# Patient Record
Sex: FEMALE | Race: WHITE | NOT HISPANIC OR LATINO | Employment: FULL TIME | URBAN - NONMETROPOLITAN AREA
[De-identification: names, ages, dates, MRNs, and addresses within clinical notes are randomized per-mention and may not be internally consistent; named-entity substitution may affect disease eponyms.]

---

## 2017-12-28 ENCOUNTER — PROCEDURE VISIT (OUTPATIENT)
Dept: OBSTETRICS AND GYNECOLOGY | Facility: CLINIC | Age: 32
End: 2017-12-28

## 2017-12-28 VITALS
DIASTOLIC BLOOD PRESSURE: 76 MMHG | SYSTOLIC BLOOD PRESSURE: 126 MMHG | BODY MASS INDEX: 41.44 KG/M2 | WEIGHT: 264 LBS | HEIGHT: 67 IN

## 2017-12-28 DIAGNOSIS — Z01.419 ENCOUNTER FOR GYNECOLOGICAL EXAMINATION WITHOUT ABNORMAL FINDING: Primary | ICD-10-CM

## 2017-12-28 PROCEDURE — 99385 PREV VISIT NEW AGE 18-39: CPT | Performed by: OBSTETRICS & GYNECOLOGY

## 2018-01-08 DIAGNOSIS — Z01.419 ENCOUNTER FOR GYNECOLOGICAL EXAMINATION WITHOUT ABNORMAL FINDING: ICD-10-CM

## 2018-01-24 ENCOUNTER — OFFICE VISIT (OUTPATIENT)
Dept: OBSTETRICS AND GYNECOLOGY | Facility: CLINIC | Age: 33
End: 2018-01-24

## 2018-01-24 VITALS
DIASTOLIC BLOOD PRESSURE: 88 MMHG | WEIGHT: 265 LBS | BODY MASS INDEX: 41.59 KG/M2 | SYSTOLIC BLOOD PRESSURE: 146 MMHG | HEIGHT: 67 IN

## 2018-01-24 DIAGNOSIS — Z32.02 PREGNANCY TEST NEGATIVE: ICD-10-CM

## 2018-01-24 DIAGNOSIS — Z30.017 ENCOUNTER FOR INSERTION SUBDERMAL CONTRACEPTIVE: Primary | ICD-10-CM

## 2018-01-24 LAB
B-HCG UR QL: NEGATIVE
INTERNAL NEGATIVE CONTROL: NEGATIVE
INTERNAL POSITIVE CONTROL: POSITIVE
Lab: NORMAL

## 2018-01-24 PROCEDURE — 11981 INSERTION DRUG DLVR IMPLANT: CPT | Performed by: OBSTETRICS & GYNECOLOGY

## 2018-01-24 PROCEDURE — 81025 URINE PREGNANCY TEST: CPT | Performed by: OBSTETRICS & GYNECOLOGY

## 2018-01-24 RX ORDER — ACETAMINOPHEN, ASPIRIN AND CAFFEINE 250; 250; 65 MG/1; MG/1; MG/1
1 TABLET, FILM COATED ORAL EVERY 6 HOURS PRN
COMMUNITY

## 2018-01-24 RX ORDER — ETONOGESTREL 68 MG/1
IMPLANT SUBCUTANEOUS
COMMUNITY
Start: 2018-01-23

## 2018-01-24 NOTE — PROGRESS NOTES
Nexplanon Insertion    Patient's last menstrual period was 01/19/2018.    Date of procedure:  1/24/2018    Risks and benefits discussed? yes  All questions answered? yes  Consents given by the patient  Written consent obtained? yes    Local anesthesia used:  yes - 5 cc's of  Meds; anesthesia local: 1% lidocaine    Procedure documentation:    The upper left arm (non-dominant) was marked at the intended site of insertion.  Betadine was used to cleanse the skin.  Local anesthesia was injected.  The Nexplanon was placed subdermally without difficulty.  The devise was able to be palpated in the arm by both myself and Janis.  Steri-strips were then placed across the site of insertion and the arm was wrapped.    She tolerated the procedure well.  There were no complications.  EBL was minimal.    Post procedure instructions: Remove the wrapping in 24 hours and the steri-strips in 5 days.  The patient has been advised to contact the office if she develops fever, redness, swelling, pain, or discharge occurs at the procedure site.  She has been counseled on the effectiveness of her contraception as well.    Follow up needed: PRN    This note was electronically signed.  Lilo Montelongo M.D.

## 2021-06-29 ENCOUNTER — OFFICE VISIT (OUTPATIENT)
Dept: OBSTETRICS AND GYNECOLOGY | Facility: CLINIC | Age: 36
End: 2021-06-29

## 2021-06-29 VITALS
SYSTOLIC BLOOD PRESSURE: 158 MMHG | DIASTOLIC BLOOD PRESSURE: 96 MMHG | HEIGHT: 67 IN | WEIGHT: 261.2 LBS | BODY MASS INDEX: 41 KG/M2

## 2021-06-29 DIAGNOSIS — Z30.46 ENCOUNTER FOR SURVEILLANCE OF IMPLANTABLE SUBDERMAL CONTRACEPTIVE: ICD-10-CM

## 2021-06-29 DIAGNOSIS — R03.0 ELEVATED BLOOD PRESSURE READING IN OFFICE WITHOUT DIAGNOSIS OF HYPERTENSION: ICD-10-CM

## 2021-06-29 DIAGNOSIS — Z01.419 ENCOUNTER FOR GYNECOLOGICAL EXAMINATION WITHOUT ABNORMAL FINDING: Primary | ICD-10-CM

## 2021-06-29 DIAGNOSIS — Z12.4 SCREENING FOR CERVICAL CANCER: ICD-10-CM

## 2021-06-29 DIAGNOSIS — Z12.31 ENCOUNTER FOR SCREENING MAMMOGRAM FOR MALIGNANT NEOPLASM OF BREAST: ICD-10-CM

## 2021-06-29 PROCEDURE — 99385 PREV VISIT NEW AGE 18-39: CPT | Performed by: PHYSICIAN ASSISTANT

## 2021-06-29 NOTE — PATIENT INSTRUCTIONS
Self breast exam monthly  Screening mammogram now  Regular exercise   Recommend establish care with PCP. Patient requests a provider in [place].   RTO 3 weeks for Nexplanon removal and reinsertion

## 2021-06-29 NOTE — PROGRESS NOTES
Subjective   Chief Complaint   Patient presents with   • Gynecologic Exam     Last pap done 17-WNL, Patient has a Nexplanon that is  and would  like to get a new one, No complaints       Janis Oshea is a 36 y.o. year old  presenting to be seen for her annual gynecological exam.   Patient has a Nexplanon which  in January of this year.  She has not been sexually active for the last 2 years.  She would like removal and reinsertion with a new Nexplanon.  She reports she has had some random light bleeds the last 3 to 4 months after Nexplanon .  Patient would like to get a screening mammogram.  She has history of breast cancer with a maternal grandmother and 2 maternal great aunts.  Patient's blood pressure is noted.  She does not have a PCP and would like referral to someone in [place].  She has had elevated blood pressure readings in the past but has not been placed on medication.    Past Medical History:   Diagnosis Date   • Depression    • History of Papanicolaou smear of cervix 2016    NORMAL    • Nexplanon insertion 2018   • Ovarian cyst    • PCOS (polycystic ovarian syndrome)    • Pre-menstrual mood disorder         Current Outpatient Medications:   •  aspirin-acetaminophen-caffeine (EXCEDRIN MIGRAINE) 250-250-65 MG per tablet, Take 1 tablet by mouth Every 6 (Six) Hours As Needed for Headache., Disp: , Rfl:   •  NEXPLANON 68 MG implant subdermal implant, , Disp: , Rfl:     Current Facility-Administered Medications:   •  [START ON 2021] Etonogestrel (NEXPLANON) 68 MG subdermal implant 1 each, 1 each, Intradermal, Once, Kim Lopez PA-C   No Known Allergies   Past Surgical History:   Procedure Laterality Date   • EAR TUBES Bilateral    • WISDOM TOOTH EXTRACTION        Social History     Socioeconomic History   • Marital status: Single     Spouse name: Not on file   • Number of children: Not on file   • Years of education: Not on file   • Highest  "education level: Not on file   Tobacco Use   • Smoking status: Current Every Day Smoker     Packs/day: 0.50     Types: Cigarettes   • Smokeless tobacco: Never Used   Vaping Use   • Vaping Use: Never used   Substance and Sexual Activity   • Alcohol use: No   • Drug use: No   • Sexual activity: Yes     Partners: Male     Birth control/protection: Condom, Implant      Family History   Problem Relation Age of Onset   • No Known Problems Father    • Cervical cancer Mother    • Hypertension Mother    • Hyperlipidemia Mother    • Cancer Paternal Grandmother    • Breast cancer Maternal Grandmother    • Cervical cancer Maternal Grandmother    • Hypertension Maternal Grandmother    • Hyperlipidemia Maternal Grandmother    • Breast cancer Maternal Aunt    • Cervical cancer Maternal Aunt    • Breast cancer Maternal Aunt        Review of Systems   Constitutional: Negative for chills, diaphoresis and fever.   Gastrointestinal: Negative for abdominal pain, constipation, diarrhea, nausea and vomiting.   Genitourinary: Negative for difficulty urinating, dysuria, menstrual problem, pelvic pain and vaginal discharge.   All other systems reviewed and are negative.          Objective   /96   Ht 170.2 cm (67\")   Wt 118 kg (261 lb 3.2 oz)   Breastfeeding No   BMI 40.91 kg/m²     Physical Exam  Constitutional:       Appearance: Normal appearance. She is well-developed. She is morbidly obese.   Eyes:      General: Lids are normal.      Extraocular Movements: Extraocular movements intact.      Conjunctiva/sclera: Conjunctivae normal.   Chest:      Breasts: Breasts are symmetrical.         Right: No inverted nipple, mass, nipple discharge, skin change or tenderness.         Left: No inverted nipple, mass, nipple discharge, skin change or tenderness.   Abdominal:      Palpations: Abdomen is soft.      Tenderness: There is no abdominal tenderness.   Genitourinary:     Labia:         Right: No rash, tenderness or lesion.         Left: " No rash, tenderness or lesion.       Urethra: No prolapse, urethral pain, urethral swelling or urethral lesion.      Vagina: No vaginal discharge, tenderness or lesions.      Cervix: No discharge or lesion.      Uterus: Not enlarged and not tender.       Adnexa:         Right: No mass or tenderness.          Left: No mass or tenderness.     Skin:     General: Skin is warm and dry.      Findings: No bruising or lesion.   Neurological:      Mental Status: She is alert.   Psychiatric:         Attention and Perception: Attention normal.         Mood and Affect: Mood normal.         Speech: Speech normal.         Behavior: Behavior is cooperative.            Result Review :                   Assessment and Plan  Diagnoses and all orders for this visit:    1. Encounter for gynecological examination without abnormal finding (Primary)    2. Screening for cervical cancer  -     Pap IG, Rfx HPV ASCU; Future    3. Encounter for screening mammogram for malignant neoplasm of breast  -     Mammo Screening Digital Tomosynthesis Bilateral With CAD; Future    4. Encounter for surveillance of implantable subdermal contraceptive  -     Etonogestrel (NEXPLANON) 68 MG subdermal implant 1 each    5. Elevated blood pressure reading in office without diagnosis of hypertension  -     Ambulatory Referral to Family Practice      Patient Instructions   Self breast exam monthly  Screening mammogram now  Regular exercise   Recommend establish care with PCP. Patient requests a provider in [place].   RTO 3 weeks for Nexplanon removal and reinsertion              This note was electronically signed.    Kim Lopez PA-C   June 29, 2021

## 2021-07-15 DIAGNOSIS — Z12.4 SCREENING FOR CERVICAL CANCER: ICD-10-CM

## 2021-07-16 ENCOUNTER — APPOINTMENT (OUTPATIENT)
Dept: MAMMOGRAPHY | Facility: HOSPITAL | Age: 36
End: 2021-07-16

## 2021-07-16 DIAGNOSIS — Z12.4 SCREENING FOR CERVICAL CANCER: ICD-10-CM

## 2021-07-27 ENCOUNTER — OFFICE VISIT (OUTPATIENT)
Dept: OBSTETRICS AND GYNECOLOGY | Facility: CLINIC | Age: 36
End: 2021-07-27

## 2021-07-27 VITALS
SYSTOLIC BLOOD PRESSURE: 148 MMHG | BODY MASS INDEX: 41.15 KG/M2 | HEIGHT: 67 IN | WEIGHT: 262.2 LBS | DIASTOLIC BLOOD PRESSURE: 84 MMHG

## 2021-07-27 DIAGNOSIS — Z30.46 ENCOUNTER FOR REMOVAL AND REINSERTION OF NEXPLANON: Primary | ICD-10-CM

## 2021-07-27 PROCEDURE — 81025 URINE PREGNANCY TEST: CPT | Performed by: PHYSICIAN ASSISTANT

## 2021-07-27 PROCEDURE — 11983 REMOVE/INSERT DRUG IMPLANT: CPT | Performed by: PHYSICIAN ASSISTANT

## 2021-07-27 NOTE — PROGRESS NOTES
Nexplanon Removal and Reinsertion    No LMP recorded. Patient has had an implant.    Date of procedure:  7/27/2021    Risks and benefits discussed? yes  All questions answered? yes  Consents given by the patient  Written consent obtained? yes    Local anesthesia used:  yes - 1.5 cc's of  Meds; anesthesia local: None, 1% lidocaine with epinephrine    Procedure documentation:    The upper left arm (non-dominant) was marked at the intended site of removal.  Betadine was used to cleanse the skin.  Local anesthesia was injected.  A vertical incision was created at the distal tip of the implant.  The implant was removed intact without difficulty.    The new Nexplanon was placed subdermally without difficulty through the same incision used to remove the prior implant.  The devise was able to be palpated in the arm by both myself and Janis.  Steri-strips were then placed across the site of insertion and the arm was wrapped.    She tolerated the procedure well.  There were no complications.  EBL was minimal.    Post procedure instructions: Remove the wrapping in 24 hours and the steri-strips in 5 days.    Follow up needed: PRN    This note was electronically signed.    Kim Lopez PA-C  July 27, 2021